# Patient Record
Sex: FEMALE | URBAN - METROPOLITAN AREA
[De-identification: names, ages, dates, MRNs, and addresses within clinical notes are randomized per-mention and may not be internally consistent; named-entity substitution may affect disease eponyms.]

---

## 2022-03-25 ENCOUNTER — OFFICE VISIT (OUTPATIENT)
Dept: URBAN - METROPOLITAN AREA CLINIC 28 | Facility: CLINIC | Age: 47
End: 2022-03-25
Payer: COMMERCIAL

## 2022-03-25 DIAGNOSIS — H52.4 PRESBYOPIA: Primary | ICD-10-CM

## 2022-03-25 PROCEDURE — 92002 INTRM OPH EXAM NEW PATIENT: CPT | Performed by: OPTOMETRIST

## 2022-03-25 ASSESSMENT — KERATOMETRY
OS: 43.75
OD: 43.63

## 2022-03-25 ASSESSMENT — INTRAOCULAR PRESSURE
OD: 17
OS: 16

## 2022-03-25 NOTE — IMPRESSION/PLAN
Impression: Presbyopia: H52.4. Plan: Updated glasses Rx given for full time wear or SVN per pt request. Monitor.

## 2023-03-31 ENCOUNTER — OFFICE VISIT (OUTPATIENT)
Dept: URBAN - METROPOLITAN AREA CLINIC 28 | Facility: CLINIC | Age: 48
End: 2023-03-31
Payer: COMMERCIAL

## 2023-03-31 DIAGNOSIS — H16.223 KERATOCONJUNCTIVITIS SICCA, NOT SPECIFIED AS SJÖGREN'S, BILATERAL: ICD-10-CM

## 2023-03-31 DIAGNOSIS — H52.4 PRESBYOPIA: Primary | ICD-10-CM

## 2023-03-31 PROCEDURE — 92012 INTRM OPH EXAM EST PATIENT: CPT | Performed by: OPTOMETRIST

## 2023-03-31 ASSESSMENT — VISUAL ACUITY
OD: 20/20
OS: 20/20

## 2023-03-31 ASSESSMENT — KERATOMETRY
OD: 43.88
OS: 43.63

## 2023-03-31 ASSESSMENT — INTRAOCULAR PRESSURE
OS: 16
OD: 16

## 2024-04-19 ENCOUNTER — OFFICE VISIT (OUTPATIENT)
Dept: URBAN - METROPOLITAN AREA CLINIC 28 | Facility: CLINIC | Age: 49
End: 2024-04-19
Payer: COMMERCIAL

## 2024-04-19 DIAGNOSIS — H52.4 PRESBYOPIA: Primary | ICD-10-CM

## 2024-04-19 PROCEDURE — 92012 INTRM OPH EXAM EST PATIENT: CPT | Performed by: OPTOMETRIST

## 2024-04-19 ASSESSMENT — KERATOMETRY
OS: 43.75
OD: 44.38

## 2024-04-19 ASSESSMENT — VISUAL ACUITY
OS: 20/20
OD: 20/20

## 2024-04-19 ASSESSMENT — INTRAOCULAR PRESSURE
OD: 16
OS: 16

## 2025-04-23 ENCOUNTER — OFFICE VISIT (OUTPATIENT)
Dept: URBAN - METROPOLITAN AREA CLINIC 28 | Facility: CLINIC | Age: 50
End: 2025-04-23
Payer: COMMERCIAL

## 2025-04-23 DIAGNOSIS — H52.4 PRESBYOPIA: Primary | ICD-10-CM

## 2025-04-23 DIAGNOSIS — H16.223 KERATOCONJUNCTIVITIS SICCA, NOT SPECIFIED AS SJ”GREN'S, BILATERAL: ICD-10-CM

## 2025-04-23 PROCEDURE — 92012 INTRM OPH EXAM EST PATIENT: CPT | Performed by: OPTOMETRIST

## 2025-04-23 ASSESSMENT — VISUAL ACUITY
OD: 20/20
OS: 20/20

## 2025-04-23 ASSESSMENT — KERATOMETRY
OS: 43.88
OD: 43.75

## 2025-04-23 ASSESSMENT — INTRAOCULAR PRESSURE
OD: 14
OS: 14